# Patient Record
Sex: FEMALE | Race: OTHER | Employment: FULL TIME | URBAN - NONMETROPOLITAN AREA
[De-identification: names, ages, dates, MRNs, and addresses within clinical notes are randomized per-mention and may not be internally consistent; named-entity substitution may affect disease eponyms.]

---

## 2024-06-11 ENCOUNTER — HOSPITAL ENCOUNTER (INPATIENT)
Age: 24
LOS: 2 days | Discharge: HOME | End: 2024-06-14
Attending: PSYCHIATRY & NEUROLOGY | Admitting: PSYCHIATRY & NEUROLOGY
Payer: COMMERCIAL

## 2024-06-11 DIAGNOSIS — R45.851 SUICIDAL IDEATION: Primary | ICD-10-CM

## 2024-06-11 LAB
ALBUMIN SERPL-MCNC: 5.3 G/DL (ref 3.5–5.2)
ALP SERPL-CCNC: 71 U/L (ref 35–104)
ALT SERPL-CCNC: 14 U/L (ref 5–33)
AMPHET UR QL SCN: NEGATIVE
ANION GAP SERPL CALCULATED.3IONS-SCNC: 16 MMOL/L (ref 7–19)
AST SERPL-CCNC: 20 U/L (ref 5–32)
BARBITURATES UR QL SCN: NEGATIVE
BASOPHILS # BLD: 0.1 K/UL (ref 0–0.2)
BASOPHILS NFR BLD: 0.7 % (ref 0–1)
BENZODIAZ UR QL SCN: NEGATIVE
BILIRUB SERPL-MCNC: 1.7 MG/DL (ref 0.2–1.2)
BUN SERPL-MCNC: 10 MG/DL (ref 6–20)
BUPRENORPHINE URINE: NEGATIVE
CALCIUM SERPL-MCNC: 9.7 MG/DL (ref 8.6–10)
CANNABINOIDS UR QL SCN: POSITIVE
CHLORIDE SERPL-SCNC: 106 MMOL/L (ref 98–111)
CO2 SERPL-SCNC: 22 MMOL/L (ref 22–29)
COCAINE UR QL SCN: NEGATIVE
CREAT SERPL-MCNC: 0.5 MG/DL (ref 0.5–0.9)
DRUG SCREEN COMMENT UR-IMP: ABNORMAL
EOSINOPHIL # BLD: 0 K/UL (ref 0–0.6)
EOSINOPHIL NFR BLD: 0.2 % (ref 0–5)
ERYTHROCYTE [DISTWIDTH] IN BLOOD BY AUTOMATED COUNT: 15.4 % (ref 11.5–14.5)
ETHANOLAMINE SERPL-MCNC: <10 MG/DL (ref 0–0.08)
FENTANYL SCREEN, URINE: NEGATIVE
GLUCOSE SERPL-MCNC: 111 MG/DL (ref 74–109)
HCT VFR BLD AUTO: 34.9 % (ref 37–47)
HGB BLD-MCNC: 10.6 G/DL (ref 12–16)
IMM GRANULOCYTES # BLD: 0 K/UL
LYMPHOCYTES # BLD: 2.4 K/UL (ref 1.1–4.5)
LYMPHOCYTES NFR BLD: 29.2 % (ref 20–40)
MCH RBC QN AUTO: 17.9 PG (ref 27–31)
MCHC RBC AUTO-ENTMCNC: 30.4 G/DL (ref 33–37)
MCV RBC AUTO: 59 FL (ref 81–99)
METHADONE UR QL SCN: NEGATIVE
METHAMPHETAMINE, URINE: NEGATIVE
MONOCYTES # BLD: 0.5 K/UL (ref 0–0.9)
MONOCYTES NFR BLD: 6.2 % (ref 0–10)
NEUTROPHILS # BLD: 5.1 K/UL (ref 1.5–7.5)
NEUTS SEG NFR BLD: 63.3 % (ref 50–65)
OPIATES UR QL SCN: NEGATIVE
OXYCODONE UR QL SCN: NEGATIVE
PCP UR QL SCN: NEGATIVE
PLATELET # BLD AUTO: 270 K/UL (ref 130–400)
PMV BLD AUTO: 9.5 FL (ref 9.4–12.3)
POTASSIUM SERPL-SCNC: 3.9 MMOL/L (ref 3.5–5)
PROT SERPL-MCNC: 7.7 G/DL (ref 6.6–8.7)
RBC # BLD AUTO: 5.92 M/UL (ref 4.2–5.4)
SARS-COV-2 RDRP RESP QL NAA+PROBE: NOT DETECTED
SODIUM SERPL-SCNC: 144 MMOL/L (ref 136–145)
TRICYCLIC ANTIDEPRESSANTS, UR: NEGATIVE
WBC # BLD AUTO: 8.1 K/UL (ref 4.8–10.8)

## 2024-06-11 PROCEDURE — 99285 EMERGENCY DEPT VISIT HI MDM: CPT

## 2024-06-11 ASSESSMENT — PAIN - FUNCTIONAL ASSESSMENT: PAIN_FUNCTIONAL_ASSESSMENT: NONE - DENIES PAIN

## 2024-06-12 PROBLEM — F32.A DEPRESSION WITH SUICIDAL IDEATION: Status: ACTIVE | Noted: 2024-06-12

## 2024-06-12 PROBLEM — R45.851 DEPRESSION WITH SUICIDAL IDEATION: Status: ACTIVE | Noted: 2024-06-12

## 2024-06-12 LAB
EKG P AXIS: 61 DEGREES
EKG P-R INTERVAL: 158 MS
EKG Q-T INTERVAL: 402 MS
EKG QRS DURATION: 82 MS
EKG QTC CALCULATION (BAZETT): 399 MS
EKG T AXIS: 44 DEGREES

## 2024-06-12 PROCEDURE — 1240000000 HC EMOTIONAL WELLNESS R&B

## 2024-06-12 PROCEDURE — 82077 ASSAY SPEC XCP UR&BREATH IA: CPT

## 2024-06-12 PROCEDURE — 80053 COMPREHEN METABOLIC PANEL: CPT

## 2024-06-12 PROCEDURE — 87635 SARS-COV-2 COVID-19 AMP PRB: CPT

## 2024-06-12 PROCEDURE — 6370000000 HC RX 637 (ALT 250 FOR IP)

## 2024-06-12 PROCEDURE — 36415 COLL VENOUS BLD VENIPUNCTURE: CPT

## 2024-06-12 PROCEDURE — G0480 DRUG TEST DEF 1-7 CLASSES: HCPCS

## 2024-06-12 PROCEDURE — 90792 PSYCH DIAG EVAL W/MED SRVCS: CPT

## 2024-06-12 PROCEDURE — 6370000000 HC RX 637 (ALT 250 FOR IP): Performed by: PSYCHIATRY & NEUROLOGY

## 2024-06-12 PROCEDURE — 85025 COMPLETE CBC W/AUTO DIFF WBC: CPT

## 2024-06-12 PROCEDURE — 80307 DRUG TEST PRSMV CHEM ANLYZR: CPT

## 2024-06-12 RX ORDER — TRAZODONE HYDROCHLORIDE 50 MG/1
50 TABLET ORAL NIGHTLY PRN
Status: DISCONTINUED | OUTPATIENT
Start: 2024-06-12 | End: 2024-06-14 | Stop reason: HOSPADM

## 2024-06-12 RX ORDER — POLYETHYLENE GLYCOL 3350 17 G
2 POWDER IN PACKET (EA) ORAL
Status: DISCONTINUED | OUTPATIENT
Start: 2024-06-12 | End: 2024-06-14 | Stop reason: HOSPADM

## 2024-06-12 RX ORDER — ACETAMINOPHEN 325 MG/1
650 TABLET ORAL EVERY 4 HOURS PRN
Status: DISCONTINUED | OUTPATIENT
Start: 2024-06-12 | End: 2024-06-14 | Stop reason: HOSPADM

## 2024-06-12 RX ORDER — POLYETHYLENE GLYCOL 3350 17 G/17G
17 POWDER, FOR SOLUTION ORAL DAILY PRN
Status: DISCONTINUED | OUTPATIENT
Start: 2024-06-12 | End: 2024-06-14 | Stop reason: HOSPADM

## 2024-06-12 RX ORDER — LURASIDONE HYDROCHLORIDE 20 MG/1
20 TABLET, FILM COATED ORAL
Status: DISCONTINUED | OUTPATIENT
Start: 2024-06-12 | End: 2024-06-14 | Stop reason: HOSPADM

## 2024-06-12 RX ORDER — NICOTINE 21 MG/24HR
1 PATCH, TRANSDERMAL 24 HOURS TRANSDERMAL DAILY
Status: DISCONTINUED | OUTPATIENT
Start: 2024-06-12 | End: 2024-06-14 | Stop reason: HOSPADM

## 2024-06-12 RX ORDER — HYDROXYZINE HYDROCHLORIDE 25 MG/1
25 TABLET, FILM COATED ORAL 3 TIMES DAILY PRN
Status: DISCONTINUED | OUTPATIENT
Start: 2024-06-12 | End: 2024-06-14 | Stop reason: HOSPADM

## 2024-06-12 RX ORDER — MECOBALAMIN 5000 MCG
5 TABLET,DISINTEGRATING ORAL NIGHTLY PRN
Status: DISCONTINUED | OUTPATIENT
Start: 2024-06-12 | End: 2024-06-14 | Stop reason: HOSPADM

## 2024-06-12 RX ADMIN — Medication 5 MG: at 02:10

## 2024-06-12 RX ADMIN — Medication 5 MG: at 21:04

## 2024-06-12 RX ADMIN — HYDROXYZINE HYDROCHLORIDE 25 MG: 25 TABLET ORAL at 02:10

## 2024-06-12 RX ADMIN — TRAZODONE HYDROCHLORIDE 50 MG: 50 TABLET ORAL at 21:04

## 2024-06-12 RX ADMIN — TRAZODONE HYDROCHLORIDE 50 MG: 50 TABLET ORAL at 02:10

## 2024-06-12 RX ADMIN — HYDROXYZINE HYDROCHLORIDE 25 MG: 25 TABLET ORAL at 21:04

## 2024-06-12 RX ADMIN — LURASIDONE HYDROCHLORIDE 20 MG: 20 TABLET, FILM COATED ORAL at 16:25

## 2024-06-12 SDOH — HEALTH STABILITY: PHYSICAL HEALTH: ON AVERAGE, HOW MANY MINUTES DO YOU ENGAGE IN EXERCISE AT THIS LEVEL?: 0 MIN

## 2024-06-12 SDOH — HEALTH STABILITY: PHYSICAL HEALTH: ON AVERAGE, HOW MANY DAYS PER WEEK DO YOU ENGAGE IN MODERATE TO STRENUOUS EXERCISE (LIKE A BRISK WALK)?: 0 DAYS

## 2024-06-12 ASSESSMENT — SOCIAL DETERMINANTS OF HEALTH (SDOH)
WITHIN THE LAST YEAR, HAVE YOU BEEN HUMILIATED OR EMOTIONALLY ABUSED IN OTHER WAYS BY YOUR PARTNER OR EX-PARTNER?: YES
HOW HARD IS IT FOR YOU TO PAY FOR THE VERY BASICS LIKE FOOD, HOUSING, MEDICAL CARE, AND HEATING?: SOMEWHAT HARD
WITHIN THE LAST YEAR, HAVE YOU BEEN AFRAID OF YOUR PARTNER OR EX-PARTNER?: YES
WITHIN THE LAST YEAR, HAVE TO BEEN RAPED OR FORCED TO HAVE ANY KIND OF SEXUAL ACTIVITY BY YOUR PARTNER OR EX-PARTNER?: NO
WITHIN THE LAST YEAR, HAVE YOU BEEN KICKED, HIT, SLAPPED, OR OTHERWISE PHYSICALLY HURT BY YOUR PARTNER OR EX-PARTNER?: YES

## 2024-06-12 ASSESSMENT — ENCOUNTER SYMPTOMS
TROUBLE SWALLOWING: 0
BLOOD IN STOOL: 0
SINUS PAIN: 0
COUGH: 0
RESPIRATORY NEGATIVE: 1
SHORTNESS OF BREATH: 0
VOMITING: 0
CONSTIPATION: 0
SORE THROAT: 0
NAUSEA: 0
DIARRHEA: 0
ABDOMINAL DISTENTION: 0
ABDOMINAL PAIN: 0
WHEEZING: 0
GASTROINTESTINAL NEGATIVE: 1

## 2024-06-12 ASSESSMENT — PATIENT HEALTH QUESTIONNAIRE - PHQ9
SUM OF ALL RESPONSES TO PHQ QUESTIONS 1-9: 16
10. IF YOU CHECKED OFF ANY PROBLEMS, HOW DIFFICULT HAVE THESE PROBLEMS MADE IT FOR YOU TO DO YOUR WORK, TAKE CARE OF THINGS AT HOME, OR GET ALONG WITH OTHER PEOPLE: EXTREMELY DIFFICULT
1. LITTLE INTEREST OR PLEASURE IN DOING THINGS: SEVERAL DAYS
3. TROUBLE FALLING OR STAYING ASLEEP: MORE THAN HALF THE DAYS
4. FEELING TIRED OR HAVING LITTLE ENERGY: SEVERAL DAYS
8. MOVING OR SPEAKING SO SLOWLY THAT OTHER PEOPLE COULD HAVE NOTICED. OR THE OPPOSITE, BEING SO FIGETY OR RESTLESS THAT YOU HAVE BEEN MOVING AROUND A LOT MORE THAN USUAL: SEVERAL DAYS
2. FEELING DOWN, DEPRESSED OR HOPELESS: MORE THAN HALF THE DAYS
5. POOR APPETITE OR OVEREATING: SEVERAL DAYS
7. TROUBLE CONCENTRATING ON THINGS, SUCH AS READING THE NEWSPAPER OR WATCHING TELEVISION: NEARLY EVERY DAY
9. THOUGHTS THAT YOU WOULD BE BETTER OFF DEAD, OR OF HURTING YOURSELF: MORE THAN HALF THE DAYS
SUM OF ALL RESPONSES TO PHQ QUESTIONS 1-9: 16
6. FEELING BAD ABOUT YOURSELF - OR THAT YOU ARE A FAILURE OR HAVE LET YOURSELF OR YOUR FAMILY DOWN: NEARLY EVERY DAY
SUM OF ALL RESPONSES TO PHQ9 QUESTIONS 1 & 2: 3
SUM OF ALL RESPONSES TO PHQ QUESTIONS 1-9: 14
SUM OF ALL RESPONSES TO PHQ QUESTIONS 1-9: 16

## 2024-06-12 ASSESSMENT — PAIN - FUNCTIONAL ASSESSMENT: PAIN_FUNCTIONAL_ASSESSMENT: NONE - DENIES PAIN

## 2024-06-12 ASSESSMENT — SLEEP AND FATIGUE QUESTIONNAIRES
SLEEP PATTERN: RESTLESSNESS
DO YOU HAVE DIFFICULTY SLEEPING: YES
DO YOU USE A SLEEP AID: NO

## 2024-06-12 ASSESSMENT — LIFESTYLE VARIABLES
HOW OFTEN DO YOU HAVE A DRINK CONTAINING ALCOHOL: MONTHLY OR LESS
HOW MANY STANDARD DRINKS CONTAINING ALCOHOL DO YOU HAVE ON A TYPICAL DAY: 1 OR 2
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
## 2024-06-12 NOTE — H&P
Behavioral Services  Medicare Certification Upon Admission    I certify that this patient's inpatient psychiatric hospital admission is medically necessary for:    [x] (1) Treatment which could reasonably be expected to improve this patient's condition,       [x] (2) Or for diagnostic study;     AND     [x](2) The inpatient psychiatric services are provided while the individual is under the care of a physician and are included in the individualized plan of care.    Estimated length of stay/service 3 days - 5 weeks    Plan for post-hospital care TBD    Electronically signed by DEBORAH Proctor CNP on 6/12/2024 at 10:05 AM

## 2024-06-12 NOTE — PROGRESS NOTES
SW met with patient to complete psychosocial and lifetime CSSR-S on this date. Patients long and short-term goals discussed. Patient voiced understanding. Treatment plan sheet signed. Patient verbalized understanding of the treatment plan. Patient participated in goals and objectives of the treatment plan. Patient discussed safety plan with .    In the last 6 months has the patient been a danger to self: No  In the last 6 months has the patient been a danger to others: No  Legal Guardian/POA: NoSW met with patient to complete psychosocial and lifetime CSSR-S on this date. Patients long and short-term goals discussed. Patient voiced understanding. Treatment plan sheet signed. Patient verbalized understanding of the treatment plan. Patient participated in goals and objectives of the treatment plan. Patient discussed safety plan with .
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
## 2024-06-12 NOTE — ED PROVIDER NOTES
[facility] EMERGENCY DEPT  eMERGENCY dEPARTMENT eNCOUnter      Pt Name: Susana Ochoa  MRN: [id number]  Birthdate 2000  Date of evaluation: 6/11/2024  Provider: NEL Yanez    CHIEF COMPLAINT       Chief Complaint   Patient presents with    Suicidal     Was driving around and was going to end her life. No specific plan.          HISTORY OF PRESENT ILLNESS   (Location/Symptom, Timing/Onset,Context/Setting, Quality, Duration, Modifying Factors, Severity)  Note limiting factors.   Susana Ochoa is a 23 y.o. female who presents to the emergency department with complaint of suicidal ideation.  She was driving around and was thinking that she wanted to kill herself.  She does not have a specific plan.  She has attempted suicide a few years ago.  She denies any homicidal ideations.  She denies hallucinations.  She states she was crying so hard she swerved and police pulled her over.  They brought her to the ED for evaluation.    NursingNotes were reviewed.    REVIEW OF SYSTEMS    (2-9 systems for level 4, 10 or more for level 5)     Review of Systems   Constitutional: Negative.    Respiratory: Negative.     Cardiovascular: Negative.    Gastrointestinal: Negative.    Musculoskeletal: Negative.    Neurological: Negative.    Psychiatric/Behavioral:  Positive for suicidal ideas. Negative for hallucinations. The patient is nervous/anxious.    All other systems reviewed and are negative.           PAST MEDICALHISTORY     Past Medical History:   Diagnosis Date    Anemia          SURGICAL HISTORY     No past surgical history on file.      CURRENT MEDICATIONS     Previous Medications    No medications on file       ALLERGIES     Patient has no known allergies.    FAMILY HISTORY     No family history on file.       SOCIAL HISTORY       Social History     Socioeconomic History    Marital status: Single   Tobacco Use    Smoking status: Never    Smokeless tobacco: Never   Vaping Use    Vaping Use: Every day   Substance and  Sexual Activity    Alcohol use: Never    Drug use: Yes     Types: Marijuana (Weed)       SCREENINGS    Augustine Coma Scale  Eye Opening: Spontaneous  Best Verbal Response: Oriented  Best Motor Response: Obeys commands  Augustine Coma Scale Score: 15        PHYSICAL EXAM    (up to 7 for level 4, 8 or more for level 5)     ED Triage Vitals   BP Temp Temp Source Pulse Respirations SpO2 Height Weight - Scale   06/11/24 2306 06/11/24 2306 06/11/24 2306 06/11/24 2306 06/11/24 2306 06/11/24 2306 06/11/24 2307 06/11/24 2307   113/81 97.4 °F (36.3 °C) Temporal 74 18 99 % 1.626 m (5' 4\") 49 kg (108 lb)       Physical Exam  Vitals and nursing note reviewed.   Constitutional:       General: She is not in acute distress.     Appearance: Normal appearance. She is not ill-appearing, toxic-appearing or diaphoretic.   HENT:      Head: Normocephalic and atraumatic.   Cardiovascular:      Rate and Rhythm: Normal rate.      Pulses: Normal pulses.   Pulmonary:      Effort: Pulmonary effort is normal. No respiratory distress.   Musculoskeletal:      Cervical back: Normal range of motion and neck supple.   Skin:     General: Skin is warm and dry.   Neurological:      General: No focal deficit present.      Mental Status: She is alert.   Psychiatric:         Attention and Perception: Attention and perception normal.         Mood and Affect: Affect normal. Mood is depressed.         Speech: Speech normal.         Behavior: Behavior is not aggressive or combative. Behavior is cooperative.         Thought Content: Thought content includes suicidal ideation. Thought content does not include homicidal ideation. Thought content does not include homicidal or suicidal plan.         DIAGNOSTIC RESULTS     LABS:  Labs Reviewed   CBC WITH AUTO DIFFERENTIAL - Abnormal; Notable for the following components:       Result Value    RBC 5.92 (*)     Hemoglobin 10.6 (*)     Hematocrit 34.9 (*)     MCV 59.0 (*)     MCH 17.9 (*)     MCHC 30.4 (*)     RDW 15.4

## 2024-06-12 NOTE — DISCHARGE INSTRUCTIONS
[facility]  Mental Health Resources*    Crisis Resources  988 Suicide and Crisis Hotline  If you or someone you know needs support now, call or text 988 or chat [employer]    Suicide Prevention Lifeline  3-606-615-QIMC(3353)    Crisis Text Link  Text [place] to 435750    [facility]  Emergency Crisis Intervention Line   [phone]    Mental Health Providers     [facility]   [facility]   [address] [address]   www.childwatchca.org   [phone]   Trauma (Child/Adolescent/Teen)  Patients under 18 years old accepted   Free Service  [facility]   [address] [place] [address] [address]   [phone]   Individual, couple, family counseling, children's counseling, depression, anxiety etc.  Patients under 18 years old accepted   Accepts Medicaid, Medicare, most Commercial Insurances  [facility]   [address] [address]   [email or web address]   [phone]   Patients under 18 years old accepted   Accepts Medicaid, Multiple Commercial   [facility]   [address] [place] [address]   [email or web address].[email or web address]/   [phone]   Patients under 18 years old accepted    Accepts Most Commercial Insurances, [place] Medicaid, Medicare  [facility]  www.[facility].org   Patients under 18 years old accepted    Accepts Medicaid, Medicare, Most Private Insurances  [facility]  [address]; [place]  [address]  [phone] option 6  [facility] [address] [facility]; [place]  [address]  [phone] option 5  [facility] - Information Age  [address]; [place] [address]  [phone] option 7  [facility] Out[facility]al [facility]   [address] [place] [address]  [address] [facility]  [email or web address]  [phone]   Single Care  www.singlecare.[employer]     [phone]  [employer] Perks  www.perks.optum.com    [phone]  Many drug manufacturers offer prescription assistance programs on their website.    Assistance with Medical Expenses:  [facility] [employer].com Financial Assistance [phone]  What they offer: The [facility] Financial Assistance Program helps uninsured patients who do not qualify for government-sponsored health insurance and cannot afford to pay for their medical care. Insured patients may also qualify for assistance based on family income, family size, and medical needs.     How to apply for the [facility] Financial Assistance Program (FAP):  Option 1: To apply for financial assistance, a patient (or their family or other provider) should fill out the Financial Assistance Application. Copies of the Financial Assistance Application and the FAP may be obtained for free by calling the [facility] [employer] Service Department at [phone].  Option 2: The Financial Assistance Application and policy may be obtained for free by downloading a copy from the [employer]com website:   https://www.coin4ce/patient-resources/financial-assistance     [facility] Financial Counselor [phone]  [address] [place] [address] [place] [address]    [employer] [phone]  [address] [place] [address]  9 AM - 3:30?PM   Monday - Friday    [facility] [phone]  [address] [place] [address]  9 AM - 3:30?PM Monday - Thursday  9 AM - 12 PM Friday    [facility] [phone]  [address] [place] [address]  8 AM - 4:30?PM Monday - Friday    [facility] [facility] [phone]  [address] [place] [address]    [employer]  Educates and supports moms and dads through the journey of pregnancy and parenting.  Website: [email or web address]/  [address] [place], [place]
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
## 2024-06-12 NOTE — PROGRESS NOTES
[facility] Adult Unit Daily Assessment  Nursing Progress Note    Room: [facility]611-01   Name: Susana Ochoa   Age: 23 y.o.   Gender: female   Dx: Depression with suicidal ideation  Precautions: close watch and suicide risk  Inpatient Status: voluntary     SLEEP:  Sleep Quality Good  Sleep Medications: Yes   PRN Sleep Meds: Yes     MEDICAL:  Other PRN Meds: No   Med Compliant: Yes  Accu-Chek: No  Oxygen/CPAP/BiPAP: No  CIWA/CINA: No   PAIN Assessment: none  Side Effects from medication: No    Metabolic Screening:  No results found for: \"LABA1C\"  No results found for: \"CHOL\"  No results found for: \"TRIG\"  No results found for: \"HDL\"  No components found for: \"LDLCAL\"  No components found for: \"LABVLDL\"  Body mass index is 18.54 kg/m².  BP Readings from Last 2 Encounters:   06/12/24 104/74       Medical Bed:   Is patient in a medical bed? no   If medical bed is in use, has nursing secured room while patient is awake and out of the room? NA  Has safety checks by nursing been completed on the bed/room this shift? yes    Protective Factors:  Patient identifies protective factors with nursing staff as follows:   Identifies reasons for living: Yes   Supportive Social Network or family: No    Belief that suicide is immoral/high spirituality: No   Responsibility to family or others/living with family: Yes   Fear of death or dying due to pain and suffering: Yes   Engaged in work or school: Yes  If Patient is unable to identify, reason why?     Depression: low   Anxiety: low   SI denies suicidal ideation   Risk of Suicide: No Risk  HI Negative for homicidal ideation        AVH:no If Hallucinations are present, describe?     Appetite: good   Percent Meals: 75%   Social: No   Speech: normal   Appearance: appropriately dressed and healthy looking    GROUP:  Group Participation: Yes  Participation Quality: Active Listener and Interactive    Notes:   ALOx4, pleasant and cooperative.  Med compliant, appetite good, slept well last night.
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
## 2024-06-12 NOTE — H&P
clinical features: fever, rigidity, mental status changes, and autonomic instability), EPS (Akathisia, Parkinsonism, Tardive dyskinesia [repetitive movements of mouth, tongue, face, trunk, or extremities], nausea, constipation, abdominal pain, galactorrhea, gynecomastia, Dizziness, Sedation, Anticholinergic effects, Hypotension, Weight gain, DM, DSL, hyperglycemia, QTc prolongation. Additionally, in regards to tardive dyskinesia pt was instructed about increase risk of permanency of these movements.   Trazodone 50 mg nightly for sleep  Discussed benefits, alternatives and risks involved with Trazodone, including - but not limited to - possible adverse effects of dizziness, hypotension, increased risk of falls w/ need to slowly transition between positions, excessive drowsiness, dry mouth, constipation or allergic reaction were discussed with the patient. Also discussed increased risk of priapism which is a painful, prolonged erection which constitutes a medical emergency for which the patient would need to notify provider while in the hospital or go to the nearest emergency room for treatment. Further discussed possibility of Serotonin Syndrome (sx including diaphoresis, agitation, muscle tension increase/rigidity, fever) with use of other serotonergic agents. Advised caution in operating vehicles/machinery after taking trazodone if continued as an outpatient.    Melatonin 5 mg nightly for sleep  Hydroxyzine 25 mg 3 times daily as needed for anxiety  Discussed benefits, alternatives, and risks including but not limited to wheezing, dyspnea, seizures, dry mouth, dizziness, increased fall risk, agitation, nausea. Advised caution in operating vehicles/machinery after taking, especially if sedation occurs.    10. Disposition: social work consulted    11.Nicotine patch 21 mg transdermal daily- smoking cessation medication  12. HGBA1C  13. LIPID PANEL  14.  TSH, vitamin D, vitamin B12 due to mood/fatigue  15.  EKG for  baseline due to antipsychotic initiation      Adrienne Paula, PMHNP-BC

## 2024-06-12 NOTE — PROGRESS NOTES
Group Note    Date: 06/12/24  Start Time: 8:00 AM   End Time:8:30 AM     Number of Participants: 6    Type of Group: Community/Goal     Patient's Goal:  \"Not sure\"    Notes:      Status After Intervention:  Unchanged    Participation Level: Active Listener    Participation Quality: Appropriate and Attentive    Speech:  normal    Thought Process/Content: Logical    Mood: Calm    Level of consciousness:  Alert and Oriented x4    Response to Learning: Able to verbalize current knowledge/experience    Modes of Intervention: Education and Support    Discipline Responsible: Registered Nurse     Signature:  Amber Rodriguez RN

## 2024-06-12 NOTE — PLAN OF CARE
Problem: Coping  Goal: Pt/Family able to verbalize concerns and demonstrate effective coping strategies  Description: INTERVENTIONS:  1. Assist patient/family to identify coping skills, available support systems and cultural and spiritual values  2. Provide emotional support, including active listening and acknowledgement of concerns of patient and caregivers  3. Reduce environmental stimuli, as able  4. Instruct patient/family in relaxation techniques, as appropriate  5. Assess for spiritual pain/suffering and initiate Spiritual Care, Psychosocial Clinical Specialist consults as needed  Outcome: Progressing  Flowsheets (Taken 6/12/2024 1032 by Gilberto Rockwell RN)  Patient/family able to verbalize anxieties, fears, and concerns, and demonstrate effective coping:   Assist patient/family to identify coping skills, available support systems and cultural and spiritual values   Provide emotional support, including active listening and acknowledgement of concerns of patient and caregivers   Reduce environmental stimuli, as able   Instruct patient/family in relaxation techniques, as appropriate   Assess for spiritual pain/suffering and initiate Spiritual Care, Psychosocial Clinical Specialist consults as needed  Note:                                                                     Group Therapy Note    Date: 6/12/2024  Start Time: 0930  End Time:  1000  Number of Participants: 3    Type of Group: Psychoeducation    Wellness Binder Information  Module Name:  Women's Issues  Session Number:  4    Group Goal for Pt:  To raise awareness of how thoughts influence feelings    Notes:  Pt demonstrated improved awareness of how thoughts influence feelings by actively participating in group discussion.    Status After Intervention:  Unchanged    Participation Level: Active Listener and Interactive    Participation Quality: Appropriate and Attentive      Speech:  normal      Thought Process/Content: Logical      Affective

## 2024-06-12 NOTE — ED NOTES
Called Dr. Hancock (psychiatry)  
Pt ambulated to bathroom without assistance or difficulty.    
Pt changed into maroon scrubs, belongings removed from room; ligature risks removed from room, cabinets locked. Security notified. Sitter initiated.   
Pts belongings sorted and bagged by .   
lb)    Height:  1.626 m (5' 4\")      Predictive Model Details   No score data available for Deterioration Index      NPO? No  O2 Flow Rate: O2 Device: None (Room air)    Cardiac Rhythm: n/a  NIH Score: NIH     Active LDA's:  n/a  Pertinent or High Risk Medications/Drips: no   If Yes, please provide details: n/a  Blood Product Administration: no  If Yes, please provide details: n/a  Sepsis Risk Score Sepsis Risk Score: 0.41    Admitted with Sepsis? No    Recommendation  Incomplete orders:   Patient Belongings: w/ security  Additional Comments:   If any further questions, please call Sending RN at [phone]    Electronically signed by: Electronically signed by Dulce Holloway RN on 6/12/2024 at 1:11 AM
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
## 2024-06-12 NOTE — PLAN OF CARE
Problem: Self Harm/Suicidality  Goal: Will have no self-injury during hospital stay  Description: INTERVENTIONS:  1.  Ensure constant observer at bedside with Q15M safety checks  2.  Maintain a safe environment  3.  Secure patient belongings  4.  Ensure family/visitors adhere to safety recommendations  5.  Ensure safety tray has been added to patient's diet order  6.  Every shift and PRN: Re-assess suicidal risk via Frequent Screener    Outcome: Progressing  Flowsheets (Taken 6/12/2024 1032)  Will have no self-injury during hospital stay:   Ensure constant observer at bedside with Q15M safety checks   Ensure family/visitors adhere to safety recommendations   Every shift and PRN: Re-assess suicidal risk via Frequent Screener   Maintain a safe environment   Ensure safety tray has been added to patient's diet order   Secure patient belongings     Problem: Anxiety  Goal: Will report anxiety at manageable levels  Description: INTERVENTIONS:  1. Administer medication as ordered  2. Teach and rehearse alternative coping skills  3. Provide emotional support with 1:1 interaction with staff  Outcome: Progressing  Flowsheets (Taken 6/12/2024 1032)  Will report anxiety at manageable levels:   Administer medication as ordered   Teach and rehearse alternative coping skills   Provide emotional support with 1:1 interaction with staff     Problem: Coping  Goal: Pt/Family able to verbalize concerns and demonstrate effective coping strategies  Description: INTERVENTIONS:  1. Assist patient/family to identify coping skills, available support systems and cultural and spiritual values  2. Provide emotional support, including active listening and acknowledgement of concerns of patient and caregivers  3. Reduce environmental stimuli, as able  4. Instruct patient/family in relaxation techniques, as appropriate  5. Assess for spiritual pain/suffering and initiate Spiritual Care, Psychosocial Clinical Specialist consults as  with , Psychosocial CNS, Spiritual Care as appropriate  Outcome: Progressing  Flowsheets (Taken 6/12/2024 1032)  Patient/family maintains appropriate behavior and adheres to behavioral management agreement, if implemented:   Encourage verbalization of thoughts and concerns in a socially appropriate manner   Assess patient/family’s coping skills and  non-compliant behavior (including use of illegal substances)   Encourage participation in the decision making process about the behavioral management agreement   Utilize positive, consistent limit setting strategies supporting safety of patient, staff and others     Problem: Depression/Self Harm  Goal: Effect of psychiatric condition will be minimized and patient will be protected from self harm  Description: INTERVENTIONS:  1. Assess impact of patient's symptoms on level of functioning, self care needs and offer support as indicated  2. Assess patient/family knowledge of depression, impact on illness and need for teaching  3. Provide emotional support, presence and reassurance  4. Assess for possible suicidal thoughts or ideation. If patient expresses suicidal thoughts or statements do not leave alone, initiate Suicide Precautions, move to a room close to the nursing station and obtain sitter  5. Initiate consults as appropriate with Mental Health Professional, Spiritual Care, Psychosocial CNS, and consider a recommendation to the LIP for a Psychiatric Consultation  Outcome: Progressing  Flowsheets  Taken 6/12/2024 1050  Effect of psychiatric condition will be minimized and patient will be protected from self harm:   Assess impact of patient’s symptoms on level of functioning, self care needs and offer support as indicated   Assess patient/family knowledge of depression, impact on illness and need for teaching   Provide emotional support, presence and reassurance   Assess for suicidal thoughts or ideation. If patient expresses suicidal thoughts or

## 2024-06-13 VITALS
HEIGHT: 64 IN | HEART RATE: 95 BPM | BODY MASS INDEX: 18.44 KG/M2 | RESPIRATION RATE: 16 BRPM | OXYGEN SATURATION: 100 % | WEIGHT: 108 LBS | TEMPERATURE: 96.8 F | DIASTOLIC BLOOD PRESSURE: 64 MMHG | SYSTOLIC BLOOD PRESSURE: 95 MMHG

## 2024-06-13 LAB
25(OH)D3 SERPL-MCNC: 12.4 NG/ML
CHOLEST SERPL-MCNC: 157 MG/DL (ref 160–199)
HBA1C MFR BLD: 4.8 % (ref 4–6)
HDLC SERPL-MCNC: 60 MG/DL (ref 65–121)
LDLC SERPL CALC-MCNC: 83 MG/DL
TRIGL SERPL-MCNC: 69 MG/DL (ref 0–149)
TSH SERPL DL<=0.005 MIU/L-ACNC: 1.29 UIU/ML (ref 0.27–4.2)
VIT B12 SERPL-MCNC: 604 PG/ML (ref 211–946)

## 2024-06-13 PROCEDURE — 99232 SBSQ HOSP IP/OBS MODERATE 35: CPT

## 2024-06-13 PROCEDURE — 6370000000 HC RX 637 (ALT 250 FOR IP): Performed by: PSYCHIATRY & NEUROLOGY

## 2024-06-13 PROCEDURE — 6370000000 HC RX 637 (ALT 250 FOR IP)

## 2024-06-13 PROCEDURE — 82306 VITAMIN D 25 HYDROXY: CPT

## 2024-06-13 PROCEDURE — 80061 LIPID PANEL: CPT

## 2024-06-13 PROCEDURE — 83036 HEMOGLOBIN GLYCOSYLATED A1C: CPT

## 2024-06-13 PROCEDURE — 36415 COLL VENOUS BLD VENIPUNCTURE: CPT

## 2024-06-13 PROCEDURE — 84443 ASSAY THYROID STIM HORMONE: CPT

## 2024-06-13 PROCEDURE — 1240000000 HC EMOTIONAL WELLNESS R&B

## 2024-06-13 PROCEDURE — 82607 VITAMIN B-12: CPT

## 2024-06-13 RX ORDER — ERGOCALCIFEROL 1.25 MG/1
50000 CAPSULE ORAL WEEKLY
Status: DISCONTINUED | OUTPATIENT
Start: 2024-06-13 | End: 2024-06-14 | Stop reason: HOSPADM

## 2024-06-13 RX ADMIN — Medication 5 MG: at 21:14

## 2024-06-13 RX ADMIN — ERGOCALCIFEROL 50000 UNITS: 1.25 CAPSULE ORAL at 08:33

## 2024-06-13 RX ADMIN — LURASIDONE HYDROCHLORIDE 20 MG: 20 TABLET, FILM COATED ORAL at 16:37

## 2024-06-13 NOTE — PROGRESS NOTES
[facility] Adult Unit Daily Assessment  Nursing Progress Note    Room: [facility]611-01   Name: Susana Ochoa   Age: 23 y.o.   Gender: female   Dx: Depression with suicidal ideation  Precautions: suicide risk  Inpatient Status: voluntary     SLEEP:  Sleep Quality Good  Sleep Medications: No   PRN Sleep Meds: Yes     MEDICAL:  Other PRN Meds: No   Med Compliant: Yes  Accu-Chek: No  Oxygen/CPAP/BiPAP: No  CIWA/CINA: No   PAIN Assessment: none  Side Effects from medication: No    Metabolic Screening:  Lab Results   Component Value Date    LABA1C 4.8 06/13/2024     Lab Results   Component Value Date    CHOL 157 (L) 06/13/2024     Lab Results   Component Value Date    TRIG 69 06/13/2024     Lab Results   Component Value Date    HDL 60 (L) 06/13/2024     No components found for: \"LDLCAL\"  No components found for: \"LABVLDL\"  Body mass index is 18.54 kg/m².  BP Readings from Last 2 Encounters:   06/13/24 97/72       Medical Bed:   Is patient in a medical bed? no   If medical bed is in use, has nursing secured room while patient is awake and out of the room?   Has safety checks by nursing been completed on the bed/room this shift?     Protective Factors:  Patient identifies protective factors with nursing staff as follows:   Identifies reasons for living: Yes   Supportive Social Network or family: Yes    Belief that suicide is immoral/high spirituality: No   Responsibility to family or others/living with family: Yes   Fear of death or dying due to pain and suffering: Yes   Engaged in work or school: Yes  If Patient is unable to identify, reason why?     Depression: 0   Anxiety: 0   SI denies suicidal ideation   Risk of Suicide: No Risk  HI Negative for homicidal ideation        AVH: no If Hallucinations are present, describe?     Appetite: good   Percent Meals: %   Social: Yes   Speech: normal   Appearance: appropriately dressed and appropriately groomed    GROUP:  Group Participation: Yes  Participation Quality: Active

## 2024-06-13 NOTE — PROGRESS NOTES
Group Note    Date: 06/13/24  Start Time: 8:00 AM   End Time:9:15 AM     Number of Participants: 8    Type of Group: Community/Goal     Patient's Goal:  \"Reading/ socializing\"    Notes:      Status After Intervention:      Participation Level: Active Listener    Participation Quality: Appropriate    Speech:  normal    Thought Process/Content: Logical    Mood:  Calm    Level of consciousness:  Alert    Response to Learning: Able to verbalize current knowledge/experience    Modes of Intervention: Education and Support    Discipline Responsible: Behavioral Health Technician     Signature:  LEONILA FISHER

## 2024-06-13 NOTE — PROGRESS NOTES
[facility]      Psychiatric Progress Note    Name:  Susana Ochoa  Date:  6/13/2024  Age:  23 y.o.  Sex:  female  Ethnicity:   Primary Care Physician:  No primary care provider on file.   Patient Care Team:  No care team member to display  Chief Complaint: \"I'm feeling better today\"        Historian:patient  Complaint Type: anxiety, decreased appetite, depression, fatigue, illegal drug usage, irritability, loss of interest in favorite activities, mood swings, sleep disturbance, and tobacco use   Course of Symptoms: ongoing  Precipitating Factors: recent breakup, stress, family stressor       Subjective  Nursing notes reviewed from overnight, no behavioral disturbances reported.  As needed medications administered overnight were added Atarax for anxiety.  Patient interviewed in her room this morning, sitting on bed.  She is calm and cooperative, affect is brighter today.  She reports \"I am actually feeling a lot better today I think getting sleep helped\".  Patient reports sleep as good.  Patient has been calm and cooperative with staff and peers. Patient has been compliant with medications. Patient has been attending groups. Patient reports appetite as good. Patient reports no side effects from medications.      Objective  Vitals:    06/13/24 0742   BP: 97/72   Pulse: 87   Resp: 16   Temp: 98.1 °F (36.7 °C)   SpO2: 97%        Previous Psychiatric/Substance Use History      Medical History:  Past Medical History:   Diagnosis Date    Anemia         ROMERO History:   Social History     Substance and Sexual Activity   Alcohol Use Never         Social History     Substance and Sexual Activity   Drug Use Yes    Types: Marijuana (Weed)        Social History     Tobacco Use   Smoking Status Never   Smokeless Tobacco Never        Family History:     No family history on file.      Vital Signs:  Last set of tests and vitals:  Vitals:    06/13/24 0742   BP: 97/72   Pulse: 87   Resp: 16   Temp:

## 2024-06-13 NOTE — PROGRESS NOTES
[facility] Adult Unit Daily Assessment  Nursing Progress Note    Room: [facility]611-01   Name: Susana Ochoa   Age: 23 y.o.   Gender: female   Dx: Depression with suicidal ideation  Precautions: close watch and suicide risk  Inpatient Status: voluntary     SLEEP:  Sleep Quality Good  Sleep Medications: Yes , Trazadone 50mg and Melatonin  PRN Sleep Meds: No     MEDICAL:  Other PRN Meds: Yes and Atarax 25mg   Med Compliant: Yes  Accu-Chek: No  Oxygen/CPAP/BiPAP: No  CIWA/CINA: No   PAIN Assessment: none  Side Effects from medication: No    Metabolic Screening:  No results found for: \"LABA1C\"  No results found for: \"CHOL\"  No results found for: \"TRIG\"  No results found for: \"HDL\"  No components found for: \"LDLCAL\"  No components found for: \"LABVLDL\"  Body mass index is 18.54 kg/m².  BP Readings from Last 2 Encounters:   06/12/24 107/71       Medical Bed:   Is patient in a medical bed? no   If medical bed is in use, has nursing secured room while patient is awake and out of the room? NA  Has safety checks by nursing been completed on the bed/room this shift? NA    Protective Factors:  Patient identifies protective factors with nursing staff as follows:   Identifies reasons for living: Yes   Supportive Social Network or family: No    Belief that suicide is immoral/high spirituality: No   Responsibility to family or others/living with family: Yes   Fear of death or dying due to pain and suffering: Yes   Engaged in work or school: Yes  If Patient is unable to identify, reason why? N/A    Depression: 0   Anxiety: 3   SI denies suicidal ideation   Risk of Suicide: No Risk  HI Negative for homicidal ideation        AVH:no If Hallucinations are present, describe?     Appetite: good   Percent Meals: snacks eaten this shift   Social: Yes   Speech: normal   Appearance: appropriately dressed, looks younger, and healthy looking    GROUP:  Group Participation: Yes  Participation Quality: Interactive    Notes:   Patient out the room most

## 2024-06-13 NOTE — PROGRESS NOTES
SW spoke with patient in regard to safe discharge planning. Patient reports that she is from [place], [place]. Reports that her car is located [address] possibly close to [place], [place] which is about an 50 minutes away from [facility] due to police bringing her in for an evaluation. States that she does not have any family close to [place] to assist her with safe transportation. Reports that she will be returning home with her mother. Reports that her mother cannot drive at this time. Patient reports that she has not been seeing a therapist or a provider for medication management prior to hospital admission. Patient declines needing additional information/resources at this time.

## 2024-06-13 NOTE — PLAN OF CARE
have no detox symptoms and will verbalize plan for changing drug-related behavior  Description: INTERVENTIONS:  1. Administer medication as ordered  2. Monitor physical status  3. Provide emotional support with 1:1 interaction with staff  4. Encourage  recovery focused treatment   Outcome: Progressing     Problem: Safety - Adult  Goal: Free from fall injury  Outcome: Progressing     Problem: Pain  Goal: Verbalizes/displays adequate comfort level or baseline comfort level  Outcome: Progressing

## 2024-06-13 NOTE — PROGRESS NOTES
Group Note    Date: 06/13/24  Start Time: 8:00 PM   End Time:8:30 PM     Number of Participants: 6    Type of Group: Wrap-Up     Patient's Goal:  Improve positive thinking    Notes:  Reports her depression has improved, energy level normal, and concentration good.     Status After Intervention:  Improved    Participation Level: Active Listener    Participation Quality: Appropriate    Speech:  normal    Thought Process/Content: Logical    Mood: elevated    Level of consciousness:  Alert    Response to Learning: Able to verbalize current knowledge/experience, Able to verbalize/acknowledge new learning, Able to retain information, Capable of insight, Able to change behavior, and Progressing to goal    Modes of Intervention: Education and Support    Discipline Responsible: Registered Nurse     Signature:  Mel Tellez RN

## 2024-06-14 PROCEDURE — 6370000000 HC RX 637 (ALT 250 FOR IP): Performed by: PSYCHIATRY & NEUROLOGY

## 2024-06-14 PROCEDURE — 99239 HOSP IP/OBS DSCHRG MGMT >30: CPT | Performed by: PSYCHIATRY & NEUROLOGY

## 2024-06-14 RX ORDER — TRAZODONE HYDROCHLORIDE 50 MG/1
50 TABLET ORAL NIGHTLY PRN
Qty: 30 TABLET | Refills: 0 | Status: SHIPPED | OUTPATIENT
Start: 2024-06-14

## 2024-06-14 RX ORDER — MECOBALAMIN 5000 MCG
5 TABLET,DISINTEGRATING ORAL NIGHTLY PRN
Qty: 30 TABLET | Refills: 0 | Status: SHIPPED | OUTPATIENT
Start: 2024-06-14

## 2024-06-14 RX ORDER — ERGOCALCIFEROL 1.25 MG/1
50000 CAPSULE ORAL WEEKLY
Qty: 5 CAPSULE | Refills: 0 | Status: SHIPPED | OUTPATIENT
Start: 2024-06-20

## 2024-06-14 RX ORDER — LURASIDONE HYDROCHLORIDE 20 MG/1
20 TABLET, FILM COATED ORAL
Qty: 30 TABLET | Refills: 0 | Status: SHIPPED | OUTPATIENT
Start: 2024-06-14

## 2024-06-14 RX ORDER — HYDROXYZINE HYDROCHLORIDE 25 MG/1
25 TABLET, FILM COATED ORAL 3 TIMES DAILY PRN
Qty: 30 TABLET | Refills: 0 | Status: SHIPPED | OUTPATIENT
Start: 2024-06-14 | End: 2024-06-24

## 2024-06-14 NOTE — PLAN OF CARE
Problem: Behavior  Goal: Pt/Family maintain appropriate behavior and adhere to behavioral management agreement, if implemented  Description: INTERVENTIONS:  1. Assess patient/family's coping skills and  non-compliant behavior (including use of illegal substances)  2. Notify security of behavior or suspected illegal substances which indicate the need for search of the family and/or belongings  3. Encourage verbalization of thoughts and concerns in a socially appropriate manner  4. Utilize positive, consistent limit setting strategies supporting safety of patient, staff and others  5. Encourage participation in the decision making process about the behavioral management agreement  6. If a visitor's behavior poses a threat to safety call refer to organization policy.  7. Initiate consult with , Psychosocial CNS, Spiritual Care as appropriate  6/14/2024 0923 by Juvenal Naidu RN  Outcome: Adequate for Discharge      Group Therapy Note     Date: 6/14/2024  Start Time: 1000  End Time:  1045  Number of Participants: 7     Type of Group: Psychotherapy        Status After Intervention:  Improved     Participation Level: Active Listener     Participation Quality: Appropriate        Speech:  normal        Thought Process/Content: Logical        Affective Functioning: Congruent        Mood: euthymic        Level of consciousness:  Alert        Response to Learning: Able to verbalize current knowledge/experience        Endings: None Reported     Modes of Intervention: Education, Support, and Socialization        Discipline Responsible: /Counselor        Signature:  Toshia Omer LPC

## 2024-06-14 NOTE — PROGRESS NOTES
Group Note    Date: 06/14/24  Start Time: 8:00 AM   End Time:8:30 AM     Number of Participants: 10    Type of Group: Community/Goal     Patient's Goal:  Discharge today    Notes:  Reports sleeping well last night and ready for discharge.     Status After Intervention:  Improved    Participation Level: Active Listener    Participation Quality: Appropriate    Speech:  normal    Thought Process/Content: Logical    Mood: elevated    Level of consciousness:  Alert    Response to Learning: Able to verbalize current knowledge/experience, Able to verbalize/acknowledge new learning, Able to retain information, Capable of insight, Able to change behavior, and Progressing to goal    Modes of Intervention: Education and Support    Discipline Responsible: Registered Nurse     Signature:  Mel Tellez RN

## 2024-06-14 NOTE — BH NOTE
Behavioral Health   Discharge Note  Bridge Appointment completed: Reviewed Discharge Instructions with patient.    Patient verbalizes understanding and agreement with the discharge plan using the teachback method.     Referral for Outpatient Tobacco Cessation Counseling, upon discharge (iradia X if applicable and completed):    ( )  Hospital staff assisted patient to call Quit Line or faxed referral                                   during hospitalization                  ( )  Recognizing danger situations (included triggers and roadblocks), if not completed on admission                    ( )  Coping skills (new ways to manage stress, exercise, relaxation techniques, changing routine, distraction), if not completed on admission                                                           ( )  Basic information about quitting (benefits of quitting, techniques in how to quit, available resources, if not completed on admission  ( ) [facility]   (x ) Patient refused referral  ( x) Patient refused counseling  ( x) Patient refused smoking cessation medication upon discharge    Vaccinations (iraida X if applicable and completed):  ( ) Patient states already received influenza vaccine elsewhere  ( ) Patient received influenza vaccine during this hospitalization  ( x) Patient refused influenza vaccine at this time      Pt discharged with followings belongings:  Dental Appliances: None  Vision - Corrective Lenses: None  Hearing Aid: None  Jewelry: Body Piercing, Necklace  Body Piercings Removed: Yes  Clothing: Dress, Footwear, Undergarments  Other Valuables: Purse, Wallet, Keys, Credit/Debit Card   Valuables sent home withpatient.   Valuables retrieved from safe and returned to patient.    Patient left department with  cab via [facility] , discharged to  home.   Patient education on aftercare instructions: yes    Patient verbalize understanding of AVS:  yes.    Suicidal

## 2024-06-14 NOTE — DISCHARGE SUMMARY
Discharge Summary     Patient ID:  Susana Ochoa  [id number]  23 y.o.  2000    Admit date: 6/11/2024  Discharge date: 6/14/2024    Admitting Physician: Brian Heart MD   Attending Physician: Jalen Reynolds MD  Discharge Provider: JALEN REYNOLDS MD     Admission Diagnoses: Suicidal ideation [R45.851]  Depression with suicidal ideation [F32.A, R45.851]    Discharge Diagnoses: Mood disorder, unspecified  Anxiety disorder, unspecified  Insomnia, unspecified  Cannabis use disorder  Nicotine dependence  Cluster B traits  R/O bipolar disorder    Admission Condition: poor    Discharged Condition: stable    Indication for Admission: suicidal ideations    HPI:  Patient is a 23-year-old  female with previous history of borderline personality disorder and anemia, admitted to our inpatient psychiatric unit secondary to suicidal ideations.  UDS positive and ER for cannabinoids.  MARQUEZ was negative.     Patient reports she has had worsening mood, did endorse suicidal thoughts for the last several days, got into an argument with her mother, reports she reached out to her ex-boyfriend, however he had blocked her, reports she got into her car and started driving with a plan to find a means to kill herself. Drove for several hours, family had been trying to reach her and called local authorities once she told them where she was and was brought to ER.  Originally from [place], moved to [place] [facility] with boyfriend last November, reports recent break-up with boyfriend.  Reports increased stress due to having to take over their apartment rent on her own as well as bills without his support.      Today, patient interviewed in her room, sitting on bed.  Wearing hospital clothing.  She is calm, cooperative, flat affect.  She reports she was diagnosed with borderline personality disorder a few years ago.  Has been in psychotherapy in the past, however has never taken any psychotropic medication.  She

## 2024-06-14 NOTE — PROGRESS NOTES
Group Note    Date: 06/14/24  Start Time: 8:00 AM   End Time:8:30 AM     Number of Participants: 10    Type of Group: Community/Goal     Patient's Goal:  Be Happy    Notes:  Reports sleeping well last night, and denies any suicidal ideations.    Status After Intervention:  Improved    Participation Level: Active Listener    Participation Quality: Appropriate    Speech:  normal    Thought Process/Content: Logical    Mood: elevated    Level of consciousness:  Alert    Response to Learning: Able to verbalize current knowledge/experience, Able to verbalize/acknowledge new learning, Able to retain information, Capable of insight, Able to change behavior, and Progressing to goal    Modes of Intervention: Education and Support    Discipline Responsible: Registered Nurse     Signature:  Mel Tellez RN

## 2024-06-14 NOTE — PROGRESS NOTES
CLINICAL PHARMACY NOTE: MEDS TO BEDS    Total # of Prescriptions Filled: 5   The following medications were delivered to the patient:  Current Discharge Medication List        START taking these medications    Details   hydrOXYzine HCl (ATARAX) 25 MG tablet Take 1 tablet by mouth 3 times daily as needed for Anxiety  Qty: 30 tablet, Refills: 0      traZODone (DESYREL) 50 MG tablet Take 1 tablet by mouth nightly as needed for Sleep  Qty: 30 tablet, Refills: 0      lurasidone (LATUDA) 20 MG TABS tablet Take 1 tablet by mouth Daily with supper  Qty: 30 tablet, Refills: 0      melatonin 5 MG TBDP disintegrating tablet Take 1 tablet by mouth nightly as needed (sleep)  Qty: 30 tablet, Refills: 0      Ergocalciferol (VITAMIN D) 71777 units CAPS Take 50,000 Units by mouth once a week  Qty: 5 capsule, Refills: 0               Additional Documentation:    Delivered RX to 6th floor nurse station. Nurse signed for RX.

## 2024-06-14 NOTE — PROGRESS NOTES
Discharge Note     Patient is discharging on this date. Patient denies SI, HI, and AVH at this time. Patient reports improvement in behavior and is leaving unit in overall good condition. SW and patient discussed patient's follow up appointments and importance of attending appointments as scheduled, patient voiced understanding and agreement. Patient and SW also discussed patient's safety plan and patient was able to verbally identify: warning signs, coping strategies, places and people that help make the patient feel better/distract negative thoughts, friends/family/agencies/professionals the patient can reach out to in a crisis, and something that is important to the patient/worth living for. Patient was provided the national suicide prevention hotline number (1-842.291.1859) as well as local community behavioral health ([facility]) crisis number for emergencies (1-143.140.4690).     Discharge Disposition: home -lives alone      Pt to follow up with:  [facility]  on June 20 , 2024 at 9:00 PM for the intake appointment. Patient will follow up with  DR Tristan and JOESPH Kapadia    On July 1 , 2024   at 3:00 PM for the medication management appointment.     Referral to outpatient tobacco cessation counseling treatment:  Patient refused referral to outpatient tobacco cessation counseling  Cab to her car [place] [address]  SW offered to assist patient with transportation, patient accepted transportation assistance - [employer] transportation was provided by cab

## 2024-06-14 NOTE — DISCHARGE INSTR - DIET

## 2024-06-14 NOTE — PROGRESS NOTES
[facility] Adult Unit Daily Assessment  Nursing Progress Note    Room: [facility]/611-01   Name: Susana Ochoa   Age: 23 y.o.   Gender: female   Dx: Depression with suicidal ideation  Precautions: suicide risk  Inpatient Status: voluntary     SLEEP:  Sleep Quality Good  Sleep Medications: No   PRN Sleep Meds: Yes; melatonin 5mg     MEDICAL:  Other PRN Meds: No   Med Compliant: Yes  Accu-Chek: No  Oxygen/CPAP/BiPAP: No  CIWA/CINA: No   PAIN Assessment: denies  Side Effects from medication: none voiced    Metabolic Screening:  Lab Results   Component Value Date    LABA1C 4.8 06/13/2024     Lab Results   Component Value Date    CHOL 157 (L) 06/13/2024     Lab Results   Component Value Date    TRIG 69 06/13/2024     Lab Results   Component Value Date    HDL 60 (L) 06/13/2024     No components found for: \"LDLCAL\"  No components found for: \"LABVLDL\"  Body mass index is 18.54 kg/m².  BP Readings from Last 2 Encounters:   06/13/24 95/64       Medical Bed:   Is patient in a medical bed? no   If medical bed is in use, has nursing secured room while patient is awake and out of the room? NA  Has safety checks by nursing been completed on the bed/room this shift? yes    Protective Factors:  Patient identifies protective factors with nursing staff as follows:   Identifies reasons for living: Yes   Supportive Social Network or family: Yes    Belief that suicide is immoral/high spirituality: No   Responsibility to family or others/living with family: Yes   Fear of death or dying due to pain and suffering: Yes   Engaged in work or school: Yes  If Patient is unable to identify, reason why? N/A    Depression: denies   Anxiety: denies   SI denies suicidal ideation   Risk of Suicide: No Risk  HI Negative for homicidal ideation        AVH:no If Hallucinations are present, describe? N/A    Appetite: good   Percent Meals: no meals consumed during this shift   Social: No   Speech: normal   Appearance: appropriately dressed and healthy

## 2024-06-14 NOTE — PLAN OF CARE
Group Therapy Note     Date: 6/14/2024  Start Time: 1100  End Time:  1130  Number of Participants: 8     Type of Group: Psychoeducation     Wellness Binder Information  Module Name:  emotional wellness  Session Number:  5     Patient's Goal:  obstacles to emotional wellness     Notes:  pt acknowledged negative thinking as an obstacle to emotional wellness.     Status After Intervention:  Improved     Participation Level: Interactive     Participation Quality: Appropriate, Attentive, and Sharing        Speech:  normal        Thought Process/Content: Logical        Affective Functioning: Congruent        Mood: congruent        Level of consciousness:  Alert, Oriented x4, and Attentive        Response to Learning: Able to verbalize current knowledge/experience        Endings: None Reported     Modes of Intervention: Education        Discipline Responsible: Psychoeducational Specialist        Signature:  Annika Ortega

## 2024-06-15 NOTE — PROGRESS NOTES
SW called pt to follow up with her after her discharge from the hospital and spoke with her. She said she was doing good and denied having questions or concerns at this time.
